# Patient Record
Sex: FEMALE | Employment: FULL TIME | URBAN - METROPOLITAN AREA
[De-identification: names, ages, dates, MRNs, and addresses within clinical notes are randomized per-mention and may not be internally consistent; named-entity substitution may affect disease eponyms.]

---

## 2018-10-03 ENCOUNTER — APPOINTMENT (OUTPATIENT)
Dept: PHYSICAL THERAPY | Age: 48
End: 2018-10-03

## 2019-04-30 ENCOUNTER — HOSPITAL ENCOUNTER (OUTPATIENT)
Dept: PHYSICAL THERAPY | Age: 49
Discharge: HOME | End: 2019-04-30
Payer: COMMERCIAL

## 2019-04-30 PROCEDURE — 97140 MANUAL THERAPY 1/> REGIONS: CPT

## 2019-04-30 PROCEDURE — 97110 THERAPEUTIC EXERCISES: CPT

## 2019-04-30 PROCEDURE — 97161 PT EVAL LOW COMPLEX 20 MIN: CPT

## 2019-05-08 NOTE — PROGRESS NOTES
[facility] at [address] Phone: [phone]      Fax:  [phone] Plan of Care/ Statement of Necessity for Physical Therapy Services Patient name: Perico Cohen Start of Care: 2019 Referral source: Jevon Munoz MD : 1970 Medical Diagnosis: Lower back pain [M54.5] Payor: Nolvia Estimable / Plan: [facility] [address] [place] [address] PPO / Product Type: Commerical /  Onset Date:2018 Treatment Diagnosis: Pelvic Obliquity, Neural tension right LE in the sciatic nerve pattern Prior Hospitalization: see medical history Provider#: [id number] Medications: Verified on Patient summary List  
 Comorbidities: Thyroid problems Prior Level of Function: Periodic LBP with house work, able to use a broom without pain The Plan of Care and following information is based on the information from the initial evaluation. Assessment/ key information: Patient is a 57 y.o. female referred to PT with the above Dx. Patient seen today for c/o of mid to low back pain with numbness going into the right LE. Limited ability to do usual work with decreased comfort. Patient presents to PT with an impaired gait, decreased balance, decreased strength, decreased flexibility, and decreased mobility. Patient s/s appear to be consistent w/ diagnosis. Patient demonstrates the potential to make functional gains within a reasonable time frame. Patient will benefit from skilled PT to address impairments and improve functional mobility, strength, gait and balance for an improved quality of life. Fall Risk Assessment: Patient demonstrates no Fall Risk Evaluation Complexity History LOW Complexity : Zero comorbidities / personal factors that will impact the outcome / POC; Examination MEDIUM Complexity : 3 Standardized tests and measures addressing body structure, function, activity limitation and / or participation in recreation ;Presentation LOW Complexity : Stable, uncomplicated  ;Clinical Decision Making MEDIUM Complexity : FOTO score of 26-74 Overall Complexity Rating: LOW Problem List: pain affecting function, decrease ROM, decrease strength, impaired gait/ balance, decrease ADL/ functional abilitiies, decrease activity tolerance, decrease flexibility/ joint mobility and decrease transfer abilities Treatment Plan may include any combination of the following: Therapeutic exercise, Therapeutic activities, Neuromuscular re-education, Physical agent/modality, Gait/balance training, Manual therapy, Patient education, Self Care training, Functional mobility training, Home safety training and Stair training Patient / Family readiness to learn indicated by: asking questions, trying to perform skills and interest 
Persons(s) to be included in education: patient (P) Barriers to Learning/Limitations: None Patient Goal (s): For the pain to go away and return to normal.  Return to gym workout 5x/wk, Jog 20 min in a Day Patient Self Reported Health Status: good Rehabilitation Potential: good Short Term Goals: To be accomplished in 5 treatments: 
   
Long Term Goals: To be accomplished in 10 treatments: 1. Pt will be compliant and independent with HEP in order to facilitate PT sessions and aid with self management Eval Status:  Initiated Current Status: 2. Pt to tolerate 30 min or more of TE and/or Interventions w/o increased s/s Eval Status:  Initiated Current Status: 1. Pt will report 50% improvement or better with right leg discomfort to allow her to return to usual daily exercise without added pain. Eval Status:  Initiated Current Status: 2. Pt will have decreased pain at 2/10 or better to allow pt to sit and socialize for an hour with little pain Eval Status:  Increased pain with longterm sitting Current Status: 3.   Pt will demonstrate fingertip - floor at 10 cm or better to show good pelvic and trunk mobility for return to exercise without difficulty Eval Status:  Fingertip - floor at 23 cm Current Status: 4. Pt will amb on TM for 1/2 mile for carryover to return to usual jogging without difficulty Eval Status:  Initiated Current Status: 5.  Pt will improve FOTO score as required per score sheet to show significant improvement in function for progress to little difficulty performing usual active lifestyle             Eval Status: TBD 
            LWKXSTQ Status: 
 
 
   
Frequency / Duration: Patient to be seen 2-3 times per week for 10 treatments. Patient/ Caregiver education and instruction: Diagnosis, prognosis, self care, activity modification and exercises 
 [x]  Plan of care has been reviewed with KEN Munoz, PT 5/8/2019 12:02 PM 
_____________________________________________________________________ I certify that the above Therapy Services are being furnished while the patient is under my care. I agree with the treatment plan and certify that this therapy is necessary. [de-identified] Signature:____________Date:_________TIME:________ 
 
[employer], Date and Time must be completed for valid certification ** Please sign and return to [facility] at [address] [place] [address] [address] [facility] [address] Phone: [phone]      Fax:  [phone]

## 2019-05-13 ENCOUNTER — HOSPITAL ENCOUNTER (OUTPATIENT)
Dept: PHYSICAL THERAPY | Age: 49
Discharge: HOME | End: 2019-05-13
Payer: COMMERCIAL

## 2019-05-13 PROCEDURE — 97140 MANUAL THERAPY 1/> REGIONS: CPT

## 2019-05-13 PROCEDURE — 97110 THERAPEUTIC EXERCISES: CPT

## 2019-05-13 NOTE — PROGRESS NOTES
PT DAILY TREATMENT NOTE 10-18 Patient Name: Lorena Burns Date:2019 : 1970 [x]  Patient  Verified Payor: Srinath Maza / [facility] / Product Type: Commerical / In time:9:32  Out time:10:13 Total Treatment Time (min): 41 Visit #: 2 of 10 Medicare/BCBS Only Total Timed Codes (min):  n/a 1:1 Treatment Time:  n/a Treatment Area: Lower back pain [M54.5] SUBJECTIVE Pain Level (0-10 scale): 1.5 Any medication changes, allergies to medications, adverse drug reactions, diagnosis change, or new procedure performed?: [x] No    [] Yes (see summary sheet for update) Subjective functional status/changes:   [] No changes reported Pt reports that the exercises have really helped with the pain. Pt reports she did not take her medication for pain today. OBJECTIVE Modality rationale: decrease pain and increase tissue extensibility to improve the patients ability to tolerate functional tasks Min Type Additional Details  
 [] Estim:  []Unatt       []IFC  []Premod []Other:  []w/ice   []w/heat Position: Location:  
 [] Estim: []Att    []TENS instruct  []NMES []Other:  []w/US   []w/ice   []w/heat Position: Location:  
 []  Traction: [] Cervical       []Lumbar 
                     [] Prone          []Supine []Intermittent   []Continuous Lbs: 
[] before manual 
[] after manual  
 []  Ultrasound: []Continuous   [] Pulsed []1MHz   []3MHz W/cm2: 
Location:  
 []  Iontophoresis with dexamethasone Location: [] Take home patch  
[] In clinic  
5 []  Ice     [x]  heat 
[]  Ice massage 
[]  Laser  
[]  Anodyne Position: hooklying Location: low back  
 []  Laser with stim 
[]  Other:  Position: Location:  
 []  Vasopneumatic Device Pressure:       [] lo [] med [] hi  
Temperature: [] lo [] med [] hi  
 [] Skin assessment post-treatment:  []intact []redness- no adverse reaction 
  []redness  adverse reaction:  
 
28 min Therapeutic Exercise:  [x] See flow sheet :  
Rationale: increase ROM and increase strength to improve the patients ability to tolerate ADLs 8 min Manual Therapy: Left LE distraction/pull to correct left upslip, shotgun technique, pelvic alignment and leg length assessment/reassessment Rationale: decrease pain, increase ROM and increase tissue extensibility to improve activity tolerance. With 
 [] TE 
 [] TA 
 [] neuro 
 [] other: Patient Education: [x] Review HEP [] Progressed/Changed HEP based on:  
[] positioning   [] body mechanics   [] transfers   [] heat/ice application   
[] other:   
 
Other Objective/Functional Measures: Initiated exercises/interventions in flow sheet. Improvement in pelvic alignment noted post manual interventions. Tightness noted in the B HS with sciatic nerve glide exercise. Pain Level (0-10 scale) post treatment: 0 
 
ASSESSMENT/Changes in Function: Reported tightness/soreness in the low back post exercises today. Reported no pain post session today as well. Educated pt that she can use a heating pad at home for 15 mins to improve tightness and soreness. Reported having a spasm in the low back with 1 repetition of PPT but reported no pain with other repetitions. Continues to have tightness in the LEs as noted with exercises today. Educated pt to not push through pain during exercises. Continue POC as tolerated.   
 
Patient will continue to benefit from skilled PT services to modify and progress therapeutic interventions, address functional mobility deficits, address ROM deficits, address strength deficits, analyze and address soft tissue restrictions, analyze and cue movement patterns, analyze and modify body mechanics/ergonomics, assess and modify postural abnormalities, address imbalance/dizziness and instruct in home and community integration to attain remaining goals. []  See Plan of Care 
[]  See progress note/recertification 
[]  See Discharge Summary Progress towards goals / Updated goals: 
Short Term Goals: To be accomplished in 5 treatments: 1. Pt will be compliant and independent with HEP in order to facilitate PT sessions and aid with self management Eval Status:  Initiated Current Status: reports compliance with HEP 2.  Pt to tolerate 30 min or more of TE and/or Interventions w/o increased s/s Eval Status:  Initiated Current Status:            
 
Long Term Goals: To be accomplished in 10 treatments: 1. Pt will report 50% improvement or better with right leg discomfort to allow her to return to usual daily exercise without added pain. Eval Status:  Initiated Current Status: 2. Pt will have decreased pain at 2/10 or better to allow pt to sit and socialize for an hour with little pain Eval Status:  Increased pain with longterm sitting Current Status: 3. Pt will demonstrate fingertip - floor at 10 cm or better to show good pelvic and trunk mobility for return to exercise without difficulty Eval Status:  Fingertip - floor at 23 cm Current Status: 4. Pt will amb on TM for 1/2 mile for carryover to return to usual jogging without difficulty Eval Status:  Initiated Current Status: 5.  Pt will improve FOTO score as required per score sheet to show significant improvement in function for progress to little difficulty performing usual active lifestyle             Eval Status: TBD 
            YHXJERV Status: PLAN [x]  Upgrade activities as tolerated     [x]  Continue plan of care [x]  Update interventions per flow sheet      
[]  Discharge due to:_ 
[]  Other:_ Osmany Collado, PT 5/13/2019  9:47 AM 
 
Future Appointments Date Time Provider Zunilda Meza 5/13/2019  9:30 AM [facility] Cordella Riedel, PT [place] [facility]

## 2019-05-20 ENCOUNTER — APPOINTMENT (OUTPATIENT)
Dept: PHYSICAL THERAPY | Age: 49
End: 2019-05-20
Payer: COMMERCIAL

## 2019-06-03 ENCOUNTER — HOSPITAL ENCOUNTER (OUTPATIENT)
Dept: PHYSICAL THERAPY | Age: 49
Discharge: HOME | End: 2019-06-03
Payer: COMMERCIAL

## 2019-06-03 PROCEDURE — 97110 THERAPEUTIC EXERCISES: CPT

## 2019-06-03 PROCEDURE — 97140 MANUAL THERAPY 1/> REGIONS: CPT

## 2019-06-03 NOTE — PROGRESS NOTES
PT DAILY TREATMENT NOTE 10-18    Patient Name: Melvin Gomez  Date:6/3/2019  : 1970  [x]  Patient  Verified  Payor: Jose Gunn / Plan: [address] PPO / Product Type: Commerical /    In [id number]  Out time:1019  Total Treatment Time (min): 40  Visit #: 3 of 10    Medicare/BCBS Only   Total Timed Codes (min):    1:1 Treatment Time:          Treatment Area: Lower back pain [M54.5]    SUBJECTIVE  Pain Level (0-10 scale): 0  Any medication changes, allergies to medications, adverse drug reactions, diagnosis change, or new procedure performed?: [x] No    [] Yes (see summary sheet for update)  Subjective functional status/changes:   [] No changes reported  Weird sensation at the right proximal triceps and into the lateral right ankle    OBJECTIVE        24 min Therapeutic Exercise:  [] See flow sheet : Pt instructed on and provided with HEP   Rationale: increase ROM, increase strength and improve coordination to improve the patients ability to tolerate increased activity levels    16 min Manual Therapy:  STM/DTM Left HS, Inf glide left sacral base, (B) Innominate p/a mobs, (B) LE LAD, (B) L/S and T/S Rot mobs, Left Innominate     Rationale: decrease pain, increase ROM and increase tissue extensibility to perform increased activity          With   [x] TE   [] TA   [] neuro   [] other: Patient Education: [x] Review HEP    [] Progressed/Changed HEP based on:   [] positioning   [] body mechanics   [] transfers   [] heat/ice application    [] other:      Other Objective/Functional Measures:   - Fingertip - floor at 18 cm today  - Elev right crest  - Ant rot right innominate  - Decr mobility (B) Innominate in stork stance  - Decr (B) pelvic sway with ambulation  - Elev right sacral base  - HS 90/90 Right 132 Left 135     Pain Level (0-10 scale) post treatment: 0    ASSESSMENT/Changes in Function:   - Good tolerance with treatment program  - Continued pelvic obliquity with an impaired gait    Patient will continue to benefit from skilled PT services to modify and progress therapeutic interventions, address functional mobility deficits, address ROM deficits, address strength deficits, analyze and address soft tissue restrictions and analyze and cue movement patterns to attain remaining goals.      []  See Plan of Care  []  See progress note/recertification  []  See Discharge Summary         Progress towards goals / Updated goals:  Short Term Goals: To be accomplished in 5 treatments:  1.  Pt will be compliant and independent with HEP in order to facilitate PT sessions and aid with self management              Eval Status:  Initiated              JFNTHTY Status: reports compliance with HEP  2.  Pt to tolerate 30 min or more of TE and/or Interventions w/o increased s/s              Eval Status:  Initiated              RNDUOGN Status:                Long Term Goals: To be accomplished in 10 treatments:  1.  Pt will report 50% improvement or better with right leg discomfort to allow her to return to usual daily exercise without added pain.               Eval Status:  Initiated              GPMCGTA Status:  2.  Pt will have decreased pain at 2/10 or better to allow pt to sit and socialize for an hour with little pain               Eval Status:  Increased pain with longterm sitting              Current Status: Progressing at 0/10 today 6/3/19  3.  Pt will demonstrate fingertip - floor at 10 cm or better to show good pelvic and trunk mobility for return to exercise without difficulty                Eval Status:  Fingertip - floor at 23 cm              Current Status: Progressing to 18cm 6/3/19  4.  Pt will amb on TM for 1/2 mile for carryover to return to usual jogging without difficulty                Eval Status:  Initiated              KCACZPK Status:  5.  Pt will improve FOTO score as required per score sheet to show significant improvement in function for progress to little difficulty performing usual active lifestyle              Eval Status: TBD              Current Status: FOTO = 50 6/3/19         PLAN  [x]  Upgrade activities as tolerated     [x]  Continue plan of care  []  Update interventions per flow sheet       []  Discharge due to:_  []  Other:_      Jef Dos Santos, PT 6/3/2019  9:37 AM    No future appointments.

## 2019-06-09 NOTE — PROGRESS NOTES
[facility] at [address] [facility]  [address]  Phone: [phone]      Fax:  [phone]    Progress Note  Patient name: Suzy Winters Start of Care: 2019   Referral source: Jimmy Crespo MD : 1970               Medical Diagnosis: Lower back pain [M54.5]  Payor: Jovana Garcia / Plan: [employer] [address] [employer] PPO / Product Type: Commerical /  Onset Date:2018               Treatment Diagnosis: Pelvic Obliquity, Neural tension right LE in the sciatic nerve pattern   Prior Hospitalization: see medical history Provider#: [id number]   Medications: Verified on Patient summary List    Comorbidities: Thyroid problems   Prior Level of Function: Periodic LBP with house work, able to use a broom without pain      Visits from Start of Care: 3    Missed Visits: 0    Established Goals:          Excellent Good         Limited           None  [] Increased ROM   []  []  []  []  [] Increased Strength  []  []  []  []  [] Increased Mobility  []  [x]  []  []   [] Decreased Pain   []  [x]  []  []  [] Decreased Swelling  []  []  []  []    Key Functional Changes: Patient has shown good progress with just 3 visits, since initial evaluation, during this treatment program. Pain as of last visit was 0/10. Patient has shown decreased pain and increased mobility in her pelvis and low back. Although she has decreased pain and improved mobility, she continues to have a pelvic obliquity and limited trunk and HS flexibility. See other objective measures below for additional details. All STG/LTGs achieved as identified below.     Fall Risk Assessment: Patient demonstrates no Fall Risk     Other Objective/Functional Measures:   - Fingertip - floor at 18 cm today  - Elev right crest  - Ant rot right innominate  - Decr mobility (B) Innominate in stork stance  - Decr (B) pelvic sway with ambulation  - Elev right sacral base  - HS 90/90 Right 132 Left 135              Progress towards goals / Updated goals:  Short Term Goals: To be accomplished in 5 treatments:  1.  Pt will be compliant and independent with HEP in order to facilitate PT sessions and aid with self management              Eval Status:  Initiated              DGQGFAV Status: reports compliance with HEP 6/3/19  2.  Pt to tolerate 30 min or more of TE and/or Interventions w/o increased s/s              Eval Status:  Initiated              IHGUVGK Status: Met at 36 Min 6/3/19                Long Term Goals: To be accomplished in 10 treatments:  1.  Pt will report 50% improvement or better with right leg discomfort to allow her to return to usual daily exercise without added pain.               Eval Status:  Initiated              DILALDS Status:  2.  Pt will have decreased pain at 2/10 or better to allow pt to sit and socialize for an hour with little pain               Eval Status:  Increased pain with longterm sitting              Current Status: Progressing at 0/10 today 6/3/19  3.  Pt will demonstrate fingertip - floor at 10 cm or better to show good pelvic and trunk mobility for return to exercise without difficulty                Eval Status:  Fingertip - floor at 23 cm              Current Status: Progressing to 18cm 6/3/19  4.  Pt will amb on TM for 1/2 mile for carryover to return to usual jogging without difficulty                Eval Status:  Initiated              DMDDANR Status:  5.  Pt will improve FOTO score as required per score sheet to show significant improvement in function for progress to little difficulty performing usual active lifestyle              Eval Status: TBD              Current Status: FOTO = 50 6/3/19      Updated Goals: to be achieved in 7 treatments:  Continue with current LTGs    ASSESSMENT/RECOMMENDATIONS:  [x]Continue therapy per initial plan/protocol at a frequency of  2-3 x per week for 7 visits  []Continue therapy with the following recommended changes:_____________________ _____________________________________________________________________  []Discontinue therapy progressing towards or have reached established goals  []Discontinue therapy due to lack of appreciable progress towards goals  []Discontinue therapy due to lack of attendance or compliance  []Await Physician's recommendations/decisions regarding therapy  []Other:________________________________________________________________    Thank you for this referral.   Igor Simental, PT 6/9/2019 2:40 PM  NOTE TO PHYSICIAN:  [address] AND   FAX TO [facility]: [phone]  If you are unable to process this request in 24 hours please contact our office:   [phone]  []  I have read the above report and request that my patient continue as recommended. []  I have read the above report and request that my patient continue therapy with the following changes/special instructions:________________________________________  []I have read the above report and request that my patient be discharged from therapy.     [de-identified] Signature:____________Date:_________TIME:________    [employer], Date and Time must be completed for valid certification **

## 2019-06-10 ENCOUNTER — HOSPITAL ENCOUNTER (OUTPATIENT)
Dept: PHYSICAL THERAPY | Age: 49
End: 2019-06-10
Payer: COMMERCIAL

## 2019-06-11 ENCOUNTER — HOSPITAL ENCOUNTER (OUTPATIENT)
Dept: PHYSICAL THERAPY | Age: 49
Discharge: HOME | End: 2019-06-11
Payer: COMMERCIAL

## 2019-06-11 PROCEDURE — 97110 THERAPEUTIC EXERCISES: CPT

## 2019-06-11 PROCEDURE — 97140 MANUAL THERAPY 1/> REGIONS: CPT

## 2019-06-11 PROCEDURE — 97035 APP MDLTY 1+ULTRASOUND EA 15: CPT

## 2019-06-11 NOTE — PROGRESS NOTES
PT DAILY TREATMENT NOTE 10-18    Patient Name: Melvin Gomze  Date:2019  : 1970  [x]  Patient  Verified  Payor: Jose Gunn / Plan: Carry U[employer] Alvarezrichard [employer] PPO / Product Type: Commerical /    In time:2:51  Out time:3:41  Total Treatment Time (min): 43  Visit #: 1 of 7    Medicare/BCBS Only   Total Timed Codes (min):  43 1:1 Treatment Time:  43       Treatment Area: Lower back pain [M54.5]    SUBJECTIVE  Pain Level (0-10 scale): 3  Any medication changes, allergies to medications, adverse drug reactions, diagnosis change, or new procedure performed?: [x] No    [] Yes (see summary sheet for update)  Subjective functional status/changes:   [] No changes reported  Increased pain today.   Reports increased pain when she does not do her exercises    OBJECTIVE    Modality rationale: decrease inflammation, decrease pain and increase tissue extensibility to improve the patients ability to tolerate usual activity with little pain   Min Type Additional Details    [] Estim:  []Unatt       []IFC  []Premod                        []Other:  []w/ice   []w/heat  Position:  Location:    [] Estim: []Att    []TENS instruct  []NMES                    []Other:  []w/US   []w/ice   []w/heat  Position:  Location:    []  Traction: [] Cervical       []Lumbar                       [] Prone          []Supine                       []Intermittent   []Continuous Lbs:  [] before manual  [] after manual   8 [x]  Ultrasound: [x]Continuous   [] Pulsed                           [x]1MHz   []3MHz W/cm2: 1.7  Location: Right Glute    []  Iontophoresis with dexamethasone         Location: [] Take home patch   [] In clinic    []  Ice     []  heat  []  Ice massage  []  Laser   []  Anodyne Position:  Location:    []  Laser with stim  []  Other:  Position:  Location:    []  Vasopneumatic Device Pressure:       [] lo [] med [] hi   Temperature: [] lo [] med [] hi   [x] Skin assessment post-treatment:  [x]intact []redness- no adverse reaction    []redness  adverse reaction:     23 min Therapeutic Exercise:  [x] See flow sheet : Assess mobility and gait   Rationale: increase ROM, increase strength and improve coordination to improve the patients ability to return to normal ambulation    12 min Manual Therapy:  STM/DTM Right Superior Glute, Inf glide left  sacral base, (B) Innominate p/a mobs, (B) LE LAD, (B) L/S and T/S Rot mobs, Left lateral shift correction     Rationale: decrease pain, increase ROM, increase tissue extensibility and decrease trigger points to perform normal activity          With   [x] TE   [] TA   [] neuro   [] other: Patient Education: [x] Review HEP    [] Progressed/Changed HEP based on:   [] positioning   [] body mechanics   [] transfers   [] heat/ice application    [] other:      Other Objective/Functional Measures:   - Left lateral shift  - elev right crest  - decr mobility (B) Innominate in stork stance  - Gait: Little to no (B) pelvic sway/arm swing or trunk rotation  - elev Left sacral base     Pain Level (0-10 scale) post treatment: 1    ASSESSMENT/Changes in Function:   - Improved pelvic alignment and mobility after treatment  - Increased pelvic sway (B) with increased arm swing after VCs to improve    Patient will continue to benefit from skilled PT services to modify and progress therapeutic interventions, address functional mobility deficits, address ROM deficits, address strength deficits, analyze and address soft tissue restrictions and analyze and cue movement patterns to attain remaining goals.      []  See Plan of Care  []  See progress note/recertification  []  See Discharge Summary         Progress towards goals / Updated goals:  Short Term Goals: To be accomplished in 5 treatments:  1.  Pt will be compliant and independent with HEP in order to facilitate PT sessions and aid with self management              Eval Status:  Initiated              VWIZWEY Status: reports compliance with HEP 6/3/19  2.  Pt to tolerate 30 min or more of TE and/or Interventions w/o increased s/s              Eval Status:  Initiated              XZXMFVN Status: Met at 36 Min 6/3/19                Long Term Goals: To be accomplished in 10 treatments:  1.  Pt will report 50% improvement or better with right leg discomfort to allow her to return to usual daily exercise without added pain.               Eval Status:  Initiated              UREEMPL Status:  2.  Pt will have decreased pain at 2/10 or better to allow pt to sit and socialize for an hour with little pain               Eval Status:  Increased pain with longterm sitting              Current Status: Progressing at 3/10 today 6/11/19  3.  Pt will demonstrate fingertip - floor at 10 cm or better to show good pelvic and trunk mobility for return to exercise without difficulty                Eval Status:  Fingertip - floor at 23 cm              Current Status: Progressing to 18cm 6/3/19  4.  Pt will amb on TM for 1/2 mile for carryover to return to usual jogging without difficulty                Eval Status:  Initiated              YNQSCHI Status:  5.  Pt will improve FOTO score as required per score sheet to show significant improvement in function for progress to little difficulty performing usual active lifestyle              Eval Status: TBD              Current Status: FOTO = 50 6/3/19        PLAN  [x]  Upgrade activities as tolerated     [x]  Continue plan of care  []  Update interventions per flow sheet       []  Discharge due to:_  []  Other:_      Vandy Sandifer, PT 6/11/2019  2:59 PM    Future Appointments   Date Time Provider Zunilda Meza   6/11/2019  3:00 PM Marybel lForian, PT [facility]   6/12/2019  2:00 PM Denise Hernandez [facility]   6/17/2019  8:00 AM Baldo Frye PTA [facility]   6/18/2019  9:00 AM Marybel Florian, PT [facility]

## 2019-06-12 ENCOUNTER — HOSPITAL ENCOUNTER (OUTPATIENT)
Dept: PHYSICAL THERAPY | Age: 49
Discharge: HOME | End: 2019-06-12
Payer: COMMERCIAL

## 2019-06-12 PROCEDURE — 97140 MANUAL THERAPY 1/> REGIONS: CPT

## 2019-06-12 PROCEDURE — 97035 APP MDLTY 1+ULTRASOUND EA 15: CPT

## 2019-06-12 PROCEDURE — 97110 THERAPEUTIC EXERCISES: CPT

## 2019-06-12 NOTE — PROGRESS NOTES
PT DAILY TREATMENT NOTE 10-18    Patient Name: Cheryl Hammer  Date:2019  : 1970  [x]  Patient  Verified  Payor: Dillan Rounds / Plan: [address] [place] [address] PPO / Product Type: Commerical /    In time:2:00  Out time:2:50  Total Treatment Time (min): 50  Visit #: 2 of 7    Medicare/BCBS Only   Total Timed Codes (min):  50 1:1 Treatment Time:  50       Treatment Area: Lower back pain [M54.5]    SUBJECTIVE  Pain Level (0-10 scale): 0/10  Any medication changes, allergies to medications, adverse drug reactions, diagnosis change, or new procedure performed?: [x] No    [] Yes (see summary sheet for update)  Subjective functional status/changes:   [] No changes reported  Pt states she was able to vacuum her house and sleep through the whole night without difficulty or interruptions. Pt was able to work out at the gym for about an hour.     OBJECTIVE    Modality rationale: decrease inflammation, decrease pain and increase tissue extensibility to improve the patients ability to tolerate usual activity with little pain   Min Type Additional Details    [] Estim:  []Unatt       []IFC  []Premod                        []Other:  []w/ice   []w/heat  Position:  Location:    [] Estim: []Att    []TENS instruct  []NMES                    []Other:  []w/US   []w/ice   []w/heat  Position:  Location:    []  Traction: [] Cervical       []Lumbar                       [] Prone          []Supine                       []Intermittent   []Continuous Lbs:  [] before manual  [] after manual   8 [x]  Ultrasound: [x]Continuous   [] Pulsed                           [x]1MHz   []3MHz W/cm2: 1.7  Location: Right Glute    []  Iontophoresis with dexamethasone         Location: [] Take home patch   [] In clinic    []  Ice     []  heat  []  Ice massage  []  Laser   []  Anodyne Position:  Location:    []  Laser with stim  []  Other:  Position:  Location:    []  Vasopneumatic Device Pressure:       [] lo [] med [] hi   Temperature: [] lo [] med [] hi   [x] Skin assessment post-treatment:  [x]intact []redness- no adverse reaction    []redness  adverse reaction:     27 min Therapeutic Exercise:  [x] See flow sheet : Assess mobility and gait   Rationale: increase ROM, increase strength and improve coordination to improve the patients ability to return to normal ambulation    15 min Manual Therapy:  STM/DTM Right Superior Glute, Inf glide left  sacral base. Rationale: decrease pain, increase ROM, increase tissue extensibility and decrease trigger points to perform normal activity          With   [x] TE   [] TA   [] neuro   [] other: Patient Education: [x] Review HEP    [] Progressed/Changed HEP based on:   [] positioning   [] body mechanics   [] transfers   [] heat/ice application    [] other:      Other Objective/Functional Measures:     Pain Level (0-10 scale) post treatment: 0/10    ASSESSMENT/Changes in Function:  Increased mobility/flexibility noted throughout session. Pt reported no pain or discomfort during or after modalities or ex. Patient will continue to benefit from skilled PT services to modify and progress therapeutic interventions, address functional mobility deficits, address ROM deficits, address strength deficits, analyze and address soft tissue restrictions and analyze and cue movement patterns to attain remaining goals.      []  See Plan of Care  []  See progress note/recertification  []  See Discharge Summary         Progress towards goals / Updated goals:        Long Term Goals: To be accomplished in 10 treatments:  1.  Pt will report 50% improvement or better with right leg discomfort to allow her to return to usual daily exercise without added pain.               Eval Status:  Initiated              MATXHZV Status:  2.  Pt will have decreased pain at 2/10 or better to allow pt to sit and socialize for an hour with little pain               Eval Status:  Increased pain with longterm sitting              Current Status: Progressing at 3/10 today 6/11/19  3.  Pt will demonstrate fingertip - floor at 10 cm or better to show good pelvic and trunk mobility for return to exercise without difficulty                Eval Status:  Fingertip - floor at 23 cm              Current Status: Progressing to 18cm 6/3/19  4.  Pt will amb on TM for 1/2 mile for carryover to return to usual jogging without difficulty                Eval Status:  Initiated              CCJJXXU Status:  5.  Pt will improve FOTO score as required per score sheet to show significant improvement in function for progress to little difficulty performing usual active lifestyle              Eval Status: TBD              Current Status: FOTO = 50 6/3/19        PLAN  [x]  Upgrade activities as tolerated     [x]  Continue plan of care  []  Update interventions per flow sheet       []  Discharge due to:_  []  Other:_      Zhang Baldwin PTA 6/12/2019  2:59 PM    Future Appointments   Date Time Provider Zunilda Meza   6/17/2019  8:00 AM Ben Ward PTA [facility]   6/18/2019  9:00 AM Amber Guaman PT [facility]

## 2019-06-17 ENCOUNTER — HOSPITAL ENCOUNTER (OUTPATIENT)
Dept: PHYSICAL THERAPY | Age: 49
Discharge: HOME | End: 2019-06-17
Payer: COMMERCIAL

## 2019-06-17 PROCEDURE — 97140 MANUAL THERAPY 1/> REGIONS: CPT

## 2019-06-17 PROCEDURE — 97110 THERAPEUTIC EXERCISES: CPT

## 2019-06-17 NOTE — PROGRESS NOTES
PT DAILY TREATMENT NOTE 10-18    Patient Name: Mio Sparks  Date:2019  : 1970  [x]  Patient  Verified  Payor: Earnestine Lynch / Plan: [place] [id number] HEALTHCARE PPO / Product Type: Commerical /    In time:800  Out time:840  Total Treatment Time (min): 40  Visit #: 3 of 7    Medicare/BCBS Only   Total Timed Codes (min):   1:1 Treatment Time:         Treatment Area: Lower back pain [M54.5]    SUBJECTIVE  Pain Level (0-10 scale): 2.5/10  Any medication changes, allergies to medications, adverse drug reactions, diagnosis change, or new procedure performed?: [x] No    [] Yes (see summary sheet for update)  Subjective functional status/changes:   [] No changes reported  Pt reports no pain for 4 days until last night. She also reports increased pain in the bottom of her right big toe. OBJECTIVE      25 min Therapeutic Exercise:  [x] See flow sheet : Assess mobility and gait   Rationale: increase ROM, increase strength and improve coordination to improve the patients ability to return to normal ambulation    15 min Manual Therapy:  MET to correct right upslip and right unilaterally rotated sacrum. KT to right plantar fascia one I strip 100% tension. Rationale: decrease pain, increase ROM, increase tissue extensibility and decrease trigger points to perform normal activity          With   [x] TE   [] TA   [] neuro   [] other: Patient Education: [x] Review HEP    [] Progressed/Changed HEP based on:   [] positioning   [] body mechanics   [] transfers   [] heat/ice application    [] other:      Other Objective/Functional Measures:     Pain Level (0-10 scale) post treatment: 0/10    ASSESSMENT/Changes in Function:  Performed KT to right plantar fascia to see if pt's antalgic gait is affecting LBP. Assess NV. Pt reported decreased back pain after alignment correction and ex.     Patient will continue to benefit from skilled PT services to modify and progress therapeutic interventions, address functional mobility deficits, address ROM deficits, address strength deficits, analyze and address soft tissue restrictions and analyze and cue movement patterns to attain remaining goals. []  See Plan of Care  []  See progress note/recertification  []  See Discharge Summary         Progress towards goals / Updated goals:        Long Term Goals: To be accomplished in 10 treatments:  1.  Pt will report 50% improvement or better with right leg discomfort to allow her to return to usual daily exercise without added pain.               Eval Status:  Initiated              FCZXFVD Status: Pt reports improvement, but has not given a specific percentage of improvement.  6/17/19  2.  Pt will have decreased pain at 2/10 or better to allow pt to sit and socialize for an hour with little pain               Eval Status:  Increased pain with longterm sitting              Current Status: Progressing at 3/10 today 6/11/19  3.  Pt will demonstrate fingertip - floor at 10 cm or better to show good pelvic and trunk mobility for return to exercise without difficulty                Eval Status:  Fingertip - floor at 23 cm              Current Status: Progressing to 18cm 6/3/19  4.  Pt will amb on TM for 1/2 mile for carryover to return to usual jogging without difficulty                Eval Status:  Initiated              FYYDEAT Status:  5.  Pt will improve FOTO score as required per score sheet to show significant improvement in function for progress to little difficulty performing usual active lifestyle              Eval Status: TBD              Current Status: FOTO = 50 6/3/19        PLAN  [x]  Upgrade activities as tolerated     [x]  Continue plan of care  []  Update interventions per flow sheet       []  Discharge due to:_  []  Other:_      Quoc Sanchez PTA 6/17/2019  2:59 PM    Future Appointments   Date Time Provider Zunilda Meza   6/18/2019  9:00 AM Burton Nugent, PT [facility]

## 2019-06-18 ENCOUNTER — HOSPITAL ENCOUNTER (OUTPATIENT)
Dept: PHYSICAL THERAPY | Age: 49
End: 2019-06-18
Payer: COMMERCIAL

## 2019-08-17 NOTE — PROGRESS NOTES
[facility]  [address] [facility], [address] [place] [address]  Phone: [phone]      Fax:  [phone]    Discharge Summary    Patient name: Shruthi Parker Borne of Care: 4/30/2019   Referral [facility] Katlyn  [facility] back pain [M54.5]  Payor: Jesus Marshall / Plan: [employer] [address] [employer] PPO / Product Type: Commerical /  Onset Date:Nov 3386               Treatment Diagnosis: Pelvic Obliquity, Neural tension right LE in the sciatic nerve pattern   Prior Hospitalization: see medical history Provider#: [id number]   Medications: Verified on Patient summary List    Comorbidities: Thyroid problems   Prior Level of Function: Periodic LBP with house work, able to use a broom without pain    Visits from Start of Care: 5    Missed Visits: 3    Reporting Period : 4/30/19 to 6/17/19      [facility] be accomplished in 10 treatments:  1.  Pt will report 50% improvement or better with right leg discomfort to allow her to return to usual daily exercise without added pain.               Eval Status:  Initiated              OSNFGKZ Status: Not Met Pt reports improvement, but has not given a specific percentage of improvement.  6/17/19  2.  Pt will have decreased pain at 2/10 or better to allow pt to sit and socialize for an hour with little pain               Eval Status:  Increased pain with longterm sitting              Current Status: Not Met, Progressing at 3/10 today 6/11/19  3.  Pt will demonstrate fingertip - floor at 10 cm or better to show good pelvic and trunk mobility for return to exercise without difficulty                Eval Status:  Fingertip - floor at 23 cm              Current Status: Not met, Progressing to 18cm 6/3/19  4.  Pt will amb on TM for 1/2 mile for carryover to return to usual jogging without difficulty                Eval Status:  Initiated              OWRMJEB Status: Not Met  5.  Pt will improve FOTO score as required per score sheet to show significant improvement in function for progress to little difficulty performing usual active lifestyle              Eval Status: TBD              Current Status: FOTO = 50 6/3/19, Not Met      Assessment/ Summary of Care: Pt seen for 5 visits with good response to treatment program.  Pain as of last visit was 2.5/10 with reports of no pain for 4 days prior to last visit. Pt has been inconsistent with visits and failed to retuen after 5th visit.   Overall good response to treatment program    RECOMMENDATIONS:  [x]Discontinue therapy: []Patient has reached or is progressing toward set goals      []Patient is non-compliant or has abdicated      [x]Due to lack of appreciable progress towards set goals    Taqueria Trammell, PT 8/17/2019 10:44 AM